# Patient Record
Sex: MALE | Race: WHITE | Employment: OTHER | URBAN - METROPOLITAN AREA
[De-identification: names, ages, dates, MRNs, and addresses within clinical notes are randomized per-mention and may not be internally consistent; named-entity substitution may affect disease eponyms.]

---

## 2017-04-28 PROBLEM — J18.9 PNEUMONIA: Status: ACTIVE | Noted: 2017-04-28

## 2017-04-28 PROBLEM — K80.20 CHOLELITHIASES: Status: ACTIVE | Noted: 2017-04-28

## 2017-04-28 PROBLEM — Z86.718 H/O BLOOD CLOTS: Status: ACTIVE | Noted: 2017-04-28

## 2017-08-21 PROBLEM — I82.402 ACUTE DEEP VEIN THROMBOSIS (DVT) OF LEFT LOWER EXTREMITY (HCC): Status: ACTIVE | Noted: 2017-08-21

## 2017-08-21 PROBLEM — I26.99 ACUTE PULMONARY EMBOLISM (HCC): Status: ACTIVE | Noted: 2017-08-21

## 2017-08-21 PROBLEM — I10 ESSENTIAL HYPERTENSION: Chronic | Status: ACTIVE | Noted: 2017-08-21

## 2017-08-21 PROBLEM — N18.4 CKD (CHRONIC KIDNEY DISEASE) STAGE 4, GFR 15-29 ML/MIN (HCC): Chronic | Status: ACTIVE | Noted: 2017-08-21

## 2017-08-22 PROBLEM — Z90.81 HISTORY OF TOTAL SPLENECTOMY: Chronic | Status: ACTIVE | Noted: 2017-08-22

## 2017-11-14 PROBLEM — M00.9 SEPTIC ARTHRITIS OF ELBOW, RIGHT (HCC): Status: ACTIVE | Noted: 2017-11-14

## 2017-11-14 PROBLEM — M79.602 LEFT ARM PAIN: Status: ACTIVE | Noted: 2017-11-14

## 2017-11-14 PROBLEM — M25.421 EFFUSION OF RIGHT ELBOW: Status: ACTIVE | Noted: 2017-11-14

## 2018-08-08 ENCOUNTER — HOSPITAL ENCOUNTER (OUTPATIENT)
Age: 63
Discharge: HOME | End: 2018-08-08
Payer: MEDICARE

## 2018-08-08 LAB
ALBUMIN SERPL-MCNC: 4.1 G/DL (ref 3.5–5.2)
ALP BLD-CCNC: 46 U/L (ref 40–129)
ALT SERPL-CCNC: 29 U/L (ref 0–40)
ANION GAP SERPL CALCULATED.3IONS-SCNC: 12 MMOL/L (ref 7–16)
AST SERPL-CCNC: 25 U/L (ref 0–39)
BACTERIA: NORMAL /HPF
BILIRUB SERPL-MCNC: 1.2 MG/DL (ref 0–1.2)
BILIRUBIN URINE: NEGATIVE
BLOOD, URINE: ABNORMAL
BUN BLDV-MCNC: 25 MG/DL (ref 8–23)
C-REACTIVE PROTEIN, HIGH SENSITIVITY: 0.8 MG/L (ref 0–3)
CALCIUM IONIZED: 1.3 MMOL/L (ref 1.15–1.33)
CALCIUM SERPL-MCNC: 9.2 MG/DL (ref 8.6–10.2)
CHLORIDE BLD-SCNC: 107 MMOL/L (ref 98–107)
CHOLESTEROL, FASTING: 179 MG/DL (ref 0–199)
CLARITY: CLEAR
CO2: 22 MMOL/L (ref 22–29)
COLOR: YELLOW
CREAT SERPL-MCNC: 2.4 MG/DL (ref 0.7–1.2)
CREATININE URINE: 95 MG/DL (ref 40–278)
GFR AFRICAN AMERICAN: 33
GFR NON-AFRICAN AMERICAN: 27 ML/MIN/1.73
GLUCOSE BLD-MCNC: 105 MG/DL (ref 74–109)
GLUCOSE URINE: NEGATIVE MG/DL
HCT VFR BLD CALC: 52 % (ref 37–54)
HDLC SERPL-MCNC: 83 MG/DL
HEMOGLOBIN: 17.6 G/DL (ref 12.5–16.5)
KETONES, URINE: NEGATIVE MG/DL
LDL CHOLESTEROL CALCULATED: 68 MG/DL (ref 0–99)
LEUKOCYTE ESTERASE, URINE: NEGATIVE
MAGNESIUM: 2.1 MG/DL (ref 1.6–2.6)
MCH RBC QN AUTO: 32.2 PG (ref 26–35)
MCHC RBC AUTO-ENTMCNC: 33.8 % (ref 32–34.5)
MCV RBC AUTO: 95.1 FL (ref 80–99.9)
MICROALBUMIN UR-MCNC: 68.6 MG/L
MICROALBUMIN/CREAT UR-RTO: 72.2 (ref 0–30)
NITRITE, URINE: NEGATIVE
PARATHYROID HORMONE INTACT: 46 PG/ML (ref 15–65)
PDW BLD-RTO: 13.5 FL (ref 11.5–15)
PH UA: 5.5 (ref 5–9)
PHOSPHORUS: 3.6 MG/DL (ref 2.5–4.5)
PLATELET # BLD: 192 E9/L (ref 130–450)
PMV BLD AUTO: 9.9 FL (ref 7–12)
POTASSIUM SERPL-SCNC: 3.9 MMOL/L (ref 3.5–5)
PROTEIN UA: ABNORMAL MG/DL
RBC # BLD: 5.47 E12/L (ref 3.8–5.8)
RBC UA: NORMAL /HPF (ref 0–2)
SODIUM BLD-SCNC: 141 MMOL/L (ref 132–146)
SPECIFIC GRAVITY UA: 1.02 (ref 1–1.03)
TOTAL PROTEIN: 7.4 G/DL (ref 6.4–8.3)
TRIGLYCERIDE, FASTING: 141 MG/DL (ref 0–149)
URIC ACID, SERUM: 5.5 MG/DL (ref 3.4–7)
UROBILINOGEN, URINE: 0.2 E.U./DL
VITAMIN D 25-HYDROXY: 61 NG/ML (ref 30–100)
VLDLC SERPL CALC-MCNC: 28 MG/DL
WBC # BLD: 9.2 E9/L (ref 4.5–11.5)
WBC UA: NORMAL /HPF (ref 0–5)

## 2018-08-08 PROCEDURE — 82570 ASSAY OF URINE CREATININE: CPT

## 2018-08-08 PROCEDURE — 83735 ASSAY OF MAGNESIUM: CPT

## 2018-08-08 PROCEDURE — 86141 C-REACTIVE PROTEIN HS: CPT

## 2018-08-08 PROCEDURE — 83970 ASSAY OF PARATHORMONE: CPT

## 2018-08-08 PROCEDURE — 84550 ASSAY OF BLOOD/URIC ACID: CPT

## 2018-08-08 PROCEDURE — 80061 LIPID PANEL: CPT

## 2018-08-08 PROCEDURE — 83036 HEMOGLOBIN GLYCOSYLATED A1C: CPT

## 2018-08-08 PROCEDURE — 84100 ASSAY OF PHOSPHORUS: CPT

## 2018-08-08 PROCEDURE — 82044 UR ALBUMIN SEMIQUANTITATIVE: CPT

## 2018-08-08 PROCEDURE — 82306 VITAMIN D 25 HYDROXY: CPT

## 2018-08-08 PROCEDURE — 81001 URINALYSIS AUTO W/SCOPE: CPT

## 2018-08-08 PROCEDURE — 36415 COLL VENOUS BLD VENIPUNCTURE: CPT

## 2018-08-08 PROCEDURE — 80053 COMPREHEN METABOLIC PANEL: CPT

## 2018-08-08 PROCEDURE — 85027 COMPLETE CBC AUTOMATED: CPT

## 2018-08-08 PROCEDURE — 82330 ASSAY OF CALCIUM: CPT

## 2018-08-10 LAB — HBA1C MFR BLD: 5.2 % (ref 4–5.6)

## 2018-08-20 ENCOUNTER — HOSPITAL ENCOUNTER (OUTPATIENT)
Age: 63
Discharge: HOME | End: 2018-08-20
Payer: MEDICARE

## 2018-08-20 LAB
ANION GAP SERPL CALCULATED.3IONS-SCNC: 11 MMOL/L (ref 7–16)
BUN BLDV-MCNC: 20 MG/DL (ref 8–23)
CALCIUM SERPL-MCNC: 9.2 MG/DL (ref 8.6–10.2)
CHLORIDE BLD-SCNC: 106 MMOL/L (ref 98–107)
CO2: 22 MMOL/L (ref 22–29)
CREAT SERPL-MCNC: 2.3 MG/DL (ref 0.7–1.2)
GFR AFRICAN AMERICAN: 35
GFR NON-AFRICAN AMERICAN: 29 ML/MIN/1.73
GLUCOSE BLD-MCNC: 110 MG/DL (ref 74–109)
POTASSIUM SERPL-SCNC: 4.1 MMOL/L (ref 3.5–5)
SODIUM BLD-SCNC: 139 MMOL/L (ref 132–146)

## 2018-08-20 PROCEDURE — 80048 BASIC METABOLIC PNL TOTAL CA: CPT

## 2018-08-20 PROCEDURE — 36415 COLL VENOUS BLD VENIPUNCTURE: CPT

## 2018-08-29 ENCOUNTER — HOSPITAL ENCOUNTER (OUTPATIENT)
Age: 63
Discharge: HOME | End: 2018-08-29
Payer: MEDICARE

## 2018-08-29 LAB
ANION GAP SERPL CALCULATED.3IONS-SCNC: 12 MMOL/L (ref 7–16)
BUN BLDV-MCNC: 21 MG/DL (ref 8–23)
CALCIUM SERPL-MCNC: 9.6 MG/DL (ref 8.6–10.2)
CHLORIDE BLD-SCNC: 106 MMOL/L (ref 98–107)
CO2: 23 MMOL/L (ref 22–29)
CREAT SERPL-MCNC: 2.4 MG/DL (ref 0.7–1.2)
GFR AFRICAN AMERICAN: 33
GFR NON-AFRICAN AMERICAN: 27 ML/MIN/1.73
GLUCOSE BLD-MCNC: 93 MG/DL (ref 74–109)
POTASSIUM SERPL-SCNC: 4.2 MMOL/L (ref 3.5–5)
SODIUM BLD-SCNC: 141 MMOL/L (ref 132–146)

## 2018-08-29 PROCEDURE — 80048 BASIC METABOLIC PNL TOTAL CA: CPT

## 2018-08-29 PROCEDURE — 36415 COLL VENOUS BLD VENIPUNCTURE: CPT

## 2019-02-01 ENCOUNTER — HOSPITAL ENCOUNTER (OUTPATIENT)
Age: 64
Discharge: HOME | End: 2019-02-03
Payer: MEDICARE

## 2019-02-01 PROCEDURE — 87070 CULTURE OTHR SPECIMN AEROBIC: CPT

## 2019-02-01 PROCEDURE — 87205 SMEAR GRAM STAIN: CPT

## 2019-02-02 LAB — GRAM STAIN ORDERABLE: NORMAL

## 2019-02-03 LAB
GRAM STAIN RESULT: NORMAL
WOUND/ABSCESS: NORMAL

## 2019-02-25 ENCOUNTER — HOSPITAL ENCOUNTER (OUTPATIENT)
Age: 64
Discharge: HOME | End: 2019-02-25
Payer: MEDICARE

## 2019-02-25 LAB
ALBUMIN SERPL-MCNC: 4.2 G/DL (ref 3.5–5.2)
ALP BLD-CCNC: 48 U/L (ref 40–129)
ALT SERPL-CCNC: 22 U/L (ref 0–40)
ANION GAP SERPL CALCULATED.3IONS-SCNC: 13 MMOL/L (ref 7–16)
AST SERPL-CCNC: 19 U/L (ref 0–39)
BACTERIA: NORMAL /HPF
BILIRUB SERPL-MCNC: 1.3 MG/DL (ref 0–1.2)
BILIRUBIN URINE: NEGATIVE
BLOOD, URINE: NORMAL
BUN BLDV-MCNC: 29 MG/DL (ref 8–23)
C-REACTIVE PROTEIN, HIGH SENSITIVITY: 0.5 MG/L (ref 0–3)
CALCIUM IONIZED: 1.39 MMOL/L (ref 1.15–1.33)
CALCIUM SERPL-MCNC: 9.4 MG/DL (ref 8.6–10.2)
CHLORIDE BLD-SCNC: 106 MMOL/L (ref 98–107)
CHOLESTEROL, FASTING: 174 MG/DL (ref 0–199)
CLARITY: CLEAR
CO2: 21 MMOL/L (ref 22–29)
COLOR: YELLOW
CREAT SERPL-MCNC: 2.6 MG/DL (ref 0.7–1.2)
CREATININE URINE: 136 MG/DL (ref 40–278)
EPITHELIAL CELLS, UA: NORMAL /HPF
GFR AFRICAN AMERICAN: 30
GFR NON-AFRICAN AMERICAN: 25 ML/MIN/1.73
GLUCOSE BLD-MCNC: 103 MG/DL (ref 74–99)
GLUCOSE URINE: NEGATIVE MG/DL
HBA1C MFR BLD: 5.4 % (ref 4–5.6)
HCT VFR BLD CALC: 51.2 % (ref 37–54)
HDLC SERPL-MCNC: 69 MG/DL
HEMOGLOBIN: 16.7 G/DL (ref 12.5–16.5)
KETONES, URINE: NEGATIVE MG/DL
LDL CHOLESTEROL CALCULATED: 70 MG/DL (ref 0–99)
LEUKOCYTE ESTERASE, URINE: NEGATIVE
MAGNESIUM: 2.1 MG/DL (ref 1.6–2.6)
MCH RBC QN AUTO: 31.9 PG (ref 26–35)
MCHC RBC AUTO-ENTMCNC: 32.6 % (ref 32–34.5)
MCV RBC AUTO: 97.7 FL (ref 80–99.9)
MICROALBUMIN UR-MCNC: 24.7 MG/L
MICROALBUMIN/CREAT UR-RTO: 18.2 (ref 0–30)
NITRITE, URINE: NEGATIVE
PARATHYROID HORMONE INTACT: 25 PG/ML (ref 15–65)
PDW BLD-RTO: 13.8 FL (ref 11.5–15)
PH UA: 6 (ref 5–9)
PHOSPHORUS: 3.5 MG/DL (ref 2.5–4.5)
PLATELET # BLD: 190 E9/L (ref 130–450)
PMV BLD AUTO: 10.3 FL (ref 7–12)
POTASSIUM SERPL-SCNC: 3.8 MMOL/L (ref 3.5–5)
PROTEIN UA: NEGATIVE MG/DL
RBC # BLD: 5.24 E12/L (ref 3.8–5.8)
RBC UA: NORMAL /HPF (ref 0–2)
SODIUM BLD-SCNC: 140 MMOL/L (ref 132–146)
SPECIFIC GRAVITY UA: 1.01 (ref 1–1.03)
TOTAL PROTEIN: 7.4 G/DL (ref 6.4–8.3)
TRIGLYCERIDE, FASTING: 176 MG/DL (ref 0–149)
URIC ACID, SERUM: 6.3 MG/DL (ref 3.4–7)
UROBILINOGEN, URINE: 0.2 E.U./DL
VITAMIN D 25-HYDROXY: 73 NG/ML (ref 30–100)
VLDLC SERPL CALC-MCNC: 35 MG/DL
WBC # BLD: 8.7 E9/L (ref 4.5–11.5)
WBC UA: NORMAL /HPF (ref 0–5)

## 2019-02-25 PROCEDURE — 81001 URINALYSIS AUTO W/SCOPE: CPT

## 2019-02-25 PROCEDURE — 85027 COMPLETE CBC AUTOMATED: CPT

## 2019-02-25 PROCEDURE — 84550 ASSAY OF BLOOD/URIC ACID: CPT

## 2019-02-25 PROCEDURE — 82570 ASSAY OF URINE CREATININE: CPT

## 2019-02-25 PROCEDURE — 80061 LIPID PANEL: CPT

## 2019-02-25 PROCEDURE — 82044 UR ALBUMIN SEMIQUANTITATIVE: CPT

## 2019-02-25 PROCEDURE — 82330 ASSAY OF CALCIUM: CPT

## 2019-02-25 PROCEDURE — 83970 ASSAY OF PARATHORMONE: CPT

## 2019-02-25 PROCEDURE — 84100 ASSAY OF PHOSPHORUS: CPT

## 2019-02-25 PROCEDURE — 86141 C-REACTIVE PROTEIN HS: CPT

## 2019-02-25 PROCEDURE — 83036 HEMOGLOBIN GLYCOSYLATED A1C: CPT

## 2019-02-25 PROCEDURE — 83735 ASSAY OF MAGNESIUM: CPT

## 2019-02-25 PROCEDURE — 82306 VITAMIN D 25 HYDROXY: CPT

## 2019-02-25 PROCEDURE — 80053 COMPREHEN METABOLIC PANEL: CPT

## 2019-02-25 PROCEDURE — 36415 COLL VENOUS BLD VENIPUNCTURE: CPT

## 2023-03-22 ENCOUNTER — TELEPHONE (OUTPATIENT)
Dept: VASCULAR SURGERY | Age: 68
End: 2023-03-22

## 2023-03-22 NOTE — TELEPHONE ENCOUNTER
Received referral from Dr. Nora Kearney for Dr. Keesha Rios regarding clot hx of lt AVF occlusion, left message for patient to return call.

## 2023-03-31 ENCOUNTER — TELEPHONE (OUTPATIENT)
Dept: VASCULAR SURGERY | Age: 68
End: 2023-03-31

## 2023-03-31 NOTE — TELEPHONE ENCOUNTER
Second Attempt:  Received referral from Dr. Ana Horta for Dr. Allie Esteves regarding history of lt AVF occlusion, left message for patient to return call.

## 2023-04-03 ENCOUNTER — TELEPHONE (OUTPATIENT)
Dept: VASCULAR SURGERY | Age: 68
End: 2023-04-03

## 2023-04-03 NOTE — TELEPHONE ENCOUNTER
Third Attempt: received referral from Dr. Melene Boast for Dr. Maggie Núñez regarding clot history of left AVF occlusion, left message for patient to return call. Will send back to Dr. Melene Boast.

## 2023-07-21 ENCOUNTER — PREP FOR PROCEDURE (OUTPATIENT)
Dept: SURGERY | Age: 68
End: 2023-07-21

## 2023-07-21 RX ORDER — SODIUM CHLORIDE 0.9 % (FLUSH) 0.9 %
5-40 SYRINGE (ML) INJECTION EVERY 12 HOURS SCHEDULED
Status: CANCELLED | OUTPATIENT
Start: 2023-07-21

## 2023-07-21 RX ORDER — SODIUM CHLORIDE 0.9 % (FLUSH) 0.9 %
5-40 SYRINGE (ML) INJECTION PRN
Status: CANCELLED | OUTPATIENT
Start: 2023-07-21

## 2023-07-21 RX ORDER — SODIUM CHLORIDE 9 MG/ML
25 INJECTION, SOLUTION INTRAVENOUS PRN
Status: CANCELLED | OUTPATIENT
Start: 2023-07-21

## 2023-07-21 RX ORDER — SODIUM CHLORIDE 9 MG/ML
INJECTION, SOLUTION INTRAVENOUS CONTINUOUS
Status: CANCELLED | OUTPATIENT
Start: 2023-07-21

## 2023-07-22 ENCOUNTER — ANESTHESIA EVENT (OUTPATIENT)
Dept: OPERATING ROOM | Age: 68
End: 2023-07-22
Payer: MEDICARE

## 2023-07-24 ENCOUNTER — HOSPITAL ENCOUNTER (OUTPATIENT)
Age: 68
Setting detail: OUTPATIENT SURGERY
Discharge: HOME | End: 2023-07-24
Attending: SURGERY | Admitting: SURGERY
Payer: MEDICARE

## 2023-07-24 ENCOUNTER — ANESTHESIA (OUTPATIENT)
Dept: OPERATING ROOM | Age: 68
End: 2023-07-24
Payer: MEDICARE

## 2023-07-24 VITALS
HEIGHT: 72 IN | BODY MASS INDEX: 29.8 KG/M2 | DIASTOLIC BLOOD PRESSURE: 65 MMHG | OXYGEN SATURATION: 98 % | HEART RATE: 63 BPM | WEIGHT: 220 LBS | TEMPERATURE: 97 F | RESPIRATION RATE: 12 BRPM | SYSTOLIC BLOOD PRESSURE: 135 MMHG

## 2023-07-24 PROCEDURE — 99153 MOD SED SAME PHYS/QHP EA: CPT | Performed by: SURGERY

## 2023-07-24 PROCEDURE — 6360000002 HC RX W HCPCS: Performed by: SURGERY

## 2023-07-24 PROCEDURE — 2709999900 HC NON-CHARGEABLE SUPPLY: Performed by: SURGERY

## 2023-07-24 PROCEDURE — 3609027000 HC COLONOSCOPY: Performed by: SURGERY

## 2023-07-24 PROCEDURE — 7100000011 HC PHASE II RECOVERY - ADDTL 15 MIN: Performed by: SURGERY

## 2023-07-24 PROCEDURE — 7100000010 HC PHASE II RECOVERY - FIRST 15 MIN: Performed by: SURGERY

## 2023-07-24 PROCEDURE — 99152 MOD SED SAME PHYS/QHP 5/>YRS: CPT | Performed by: SURGERY

## 2023-07-24 RX ORDER — SODIUM CHLORIDE 0.9 % (FLUSH) 0.9 %
5-40 SYRINGE (ML) INJECTION PRN
Status: DISCONTINUED | OUTPATIENT
Start: 2023-07-24 | End: 2023-07-24 | Stop reason: HOSPADM

## 2023-07-24 RX ORDER — SODIUM CHLORIDE 9 MG/ML
INJECTION, SOLUTION INTRAVENOUS CONTINUOUS
Status: DISCONTINUED | OUTPATIENT
Start: 2023-07-24 | End: 2023-07-24 | Stop reason: HOSPADM

## 2023-07-24 RX ORDER — SODIUM CHLORIDE 0.9 % (FLUSH) 0.9 %
5-40 SYRINGE (ML) INJECTION EVERY 12 HOURS SCHEDULED
Status: DISCONTINUED | OUTPATIENT
Start: 2023-07-24 | End: 2023-07-24 | Stop reason: HOSPADM

## 2023-07-24 RX ORDER — MEPERIDINE HYDROCHLORIDE 50 MG/ML
INJECTION INTRAMUSCULAR; INTRAVENOUS; SUBCUTANEOUS PRN
Status: DISCONTINUED | OUTPATIENT
Start: 2023-07-24 | End: 2023-07-24 | Stop reason: ALTCHOICE

## 2023-07-24 RX ORDER — MIDAZOLAM HYDROCHLORIDE 1 MG/ML
INJECTION INTRAMUSCULAR; INTRAVENOUS PRN
Status: DISCONTINUED | OUTPATIENT
Start: 2023-07-24 | End: 2023-07-24 | Stop reason: ALTCHOICE

## 2023-07-24 RX ORDER — SODIUM CHLORIDE 9 MG/ML
25 INJECTION, SOLUTION INTRAVENOUS PRN
Status: DISCONTINUED | OUTPATIENT
Start: 2023-07-24 | End: 2023-07-24 | Stop reason: HOSPADM

## 2023-07-24 RX ORDER — LOSARTAN POTASSIUM 25 MG/1
25 TABLET ORAL DAILY
COMMUNITY

## 2023-07-24 ASSESSMENT — PAIN - FUNCTIONAL ASSESSMENT: PAIN_FUNCTIONAL_ASSESSMENT: NONE - DENIES PAIN
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
## 2023-07-24 NOTE — ANESTHESIA POSTPROCEDURE EVALUATION
Department of Anesthesiology  Postprocedure Note    Patient: Ian Enrique  MRN: [id number]  YOB: 1955  Date of evaluation: 7/24/2023      Procedure Summary     Date: 07/24/23 Room / Location: [facility] [place]    Anesthesia Start:  Anesthesia Stop:     Procedure: COLONOSCOPY DIAGNOSTIC Diagnosis:       Screening for colon cancer      (Screening for colon cancer [Z12.11])    Surgeons: Oralia Dickinson MD Responsible Provider:     Anesthesia Type: MAC ASA Status: 4          Anesthesia Type: No value filed.     Eulogio Phase I: Eulogio Score: 10    Eulogio Phase II:        Anesthesia Post Evaluation    Patient location during evaluation: PACU  Patient participation: complete - patient participated  Level of consciousness: awake and alert  Nausea & Vomiting: no vomiting and no nausea  Complications: no  Cardiovascular status: hemodynamically stable  Respiratory status: acceptable and spontaneous ventilation  Hydration status: stable

## 2023-07-24 NOTE — DISCHARGE INSTR - DIET

## 2023-07-24 NOTE — PROGRESS NOTES
8444 transferred to [facility] post colonoscopy . Repositioned to left side and encouraged to pass air. Abdomen soft . No discomfort noted. 2012 family to bedside.

## 2023-07-24 NOTE — BRIEF OP NOTE
Brief Postoperative Note      Patient: Gelacio Merritt  YOB: 1955  MRN: [id number]    Date of Procedure: 7/24/2023    Pre-Op Diagnosis Codes:     * Screening for colon cancer [Z12.11]    Post-Op Diagnosis: Same       Procedure(s):  COLONOSCOPY DIAGNOSTIC    Surgeon(s):  Gt Lemus MD    Assistant:  * No surgical staff found *    Anesthesia: IV Sedation    Estimated Blood Loss (mL): Minimal    Complications: None    Specimens:   * No specimens in log *    Implants:  * No implants in log *      Drains:   Hemodialysis Arteriovenous fistula Left Arm (Active)       Findings: normal colon      Electronically signed by Gt Lemus MD on 7/24/2023 at 8:37 AM

## 2023-07-24 NOTE — PROGRESS NOTES
0945 discharge instructions reviewed and patient verbalizes understanding. Discharged via wheelchair.

## 2023-07-25 NOTE — PROCEDURES
[address], [facility] [address]                                 PROCEDURE NOTE    PATIENT NAME: Rock Thomas         :        1955  MED REC NO:   [id number]                            ROOM:  ACCOUNT NO:   [de-identified]                           ADMIT DATE: 2023  PROVIDER:     Chantale Marsh MD    DATE OF PROCEDURE:  2023    SURGEON:  Chantale Marsh MD    PREPROCEDURE DIAGNOSES:  Screening colonoscopy, the patient having had a  colon resection and ileocolic anastomosis. PROCEDURE:  Consisted of colonoscopy to the terminal ileum and  approximately 60 cm within the small bowel. BLOOD LOSS:  Minimal.    ANESTHESIA:  Procedure was carried out under awake sedation. DESCRIPTION OF THE PROCEDURE:  The patient left in supine position. Rectal examination was performed which revealed adequate anal tone, if  not slightly increased. No masses within the rectal ampulla. The  endoscope was then introduced. Prostate was not enlarged with  preservation of intraprostatic groove. Endoscope was then introduced  and progressed without difficulty through the rectosigmoid and into the  terminal ileum through a distance approximately 60-70 cm. Anastomosis  was widely patent. There were no mucosal pathology at any of these  levels. The endoscope was then withdrawn without any new findings. The  patient tolerated the procedure well. This would be considered a normal  colon and normal small bowel to the distance that was _____. The  patient was discharged from the endo suite in stable condition and will  be followed up on outpatient basis in my office for further management  as needed. Repeated endoscopy is not recommended for another 10 years.         Chantale Marsh MD    D: 2023 12:25:41       T: 2023 12:28:09     [email or web address]  Job#: [id number]     Doc#: [id number]    CC:

## 2024-06-03 ENCOUNTER — HOSPITAL ENCOUNTER (OUTPATIENT)
Dept: ULTRASOUND IMAGING | Age: 69
Discharge: HOME | End: 2024-06-05
Attending: INTERNAL MEDICINE
Payer: MEDICARE

## 2024-06-03 DIAGNOSIS — I82.4Y1 DEEP VEIN THROMBOSIS (DVT) OF PROXIMAL VEIN OF RIGHT LOWER EXTREMITY, UNSPECIFIED CHRONICITY (HCC): ICD-10-CM

## 2024-06-03 PROCEDURE — 93970 EXTREMITY STUDY: CPT

## 2024-06-04 ENCOUNTER — HOSPITAL ENCOUNTER (EMERGENCY)
Age: 69
Discharge: HOME | End: 2024-06-04
Attending: EMERGENCY MEDICINE
Payer: MEDICARE

## 2024-06-04 VITALS
TEMPERATURE: 97 F | HEART RATE: 58 BPM | SYSTOLIC BLOOD PRESSURE: 142 MMHG | DIASTOLIC BLOOD PRESSURE: 81 MMHG | RESPIRATION RATE: 18 BRPM | OXYGEN SATURATION: 97 %

## 2024-06-04 DIAGNOSIS — I82.4Y1 ACUTE DEEP VEIN THROMBOSIS (DVT) OF PROXIMAL VEIN OF RIGHT LOWER EXTREMITY (HCC): Primary | ICD-10-CM

## 2024-06-04 PROCEDURE — 99283 EMERGENCY DEPT VISIT LOW MDM: CPT

## 2024-06-04 ASSESSMENT — LIFESTYLE VARIABLES: HOW OFTEN DO YOU HAVE A DRINK CONTAINING ALCOHOL: 2-3 TIMES A WEEK
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
## 2025-07-29 LAB
MICROORGANISM/AGENT SPEC: NORMAL
SPECIMEN DESCRIPTION: NORMAL

## (undated) DEVICE — GAUZE,SPONGE,4"X4",8PLY,STRL,LF,10/TRAY: Brand: MEDLINE

## (undated) DEVICE — CONNECTOR IRRIGATION AUXILIARY H2O JET W/ PRT MTL THRD HYDR

## (undated) DEVICE — DEFENDO AIR WATER SUCTION AND BIOPSY VALVE KIT FOR  OLYMPUS: Brand: DEFENDO AIR/WATER/SUCTION AND BIOPSY VALVE